# Patient Record
(demographics unavailable — no encounter records)

---

## 2024-11-19 NOTE — HISTORY OF PRESENT ILLNESS
[de-identified] : 29 yrs old female with PMHx of Anx/ Bipolar disease, ADHD, Tourette's Syndrome, presents for a full annual examination. Patient follows up with Psychiatrist Dr Martin Bauer.  Patient states that her anxiety and bipolar disease are very well-controlled, she is not sure about her Tourette syndrome but says that her psychiatrist diagnosed her with that. She tends to nervously clear her throat, or close her eyes and open them frequently?... Patient is concerned about a feeling of crawling in her skin she tends to itch in different parts of her body and this has been going on for years; she specifically says that even before being on all these medications even before the age of 17 she used to have these crawling sensation in her skin.  She would like to see a neurologist. Patient also sprained her right ankle 1 months ago, went to urgent care had an x-ray which was negative was given a wrap and then an ankle sleeve.  She did improve however has still pain and walks with a little bit of a limp she would like to be assessed or referred to an orthopedist.  Vaccines: Covid-2 initials, 1 booster flu shot- wants it Tdap- ?

## 2024-11-19 NOTE — REVIEW OF SYSTEMS
[Itching] : Itching [Negative] : Heme/Lymph [FreeTextEntry4] : phlegm in throat- crackling sound in right ear and occ high pitched sound [FreeTextEntry9] : right ankle [de-identified] : feeling of skin crawling for many years [de-identified] : controlled anxiety and bipolar

## 2024-11-19 NOTE — HISTORY OF PRESENT ILLNESS
[de-identified] : 29 yrs old female with PMHx of Anx/ Bipolar disease, ADHD, Tourette's Syndrome, presents for a full annual examination. Patient follows up with Psychiatrist Dr Martin Bauer.  Patient states that her anxiety and bipolar disease are very well-controlled, she is not sure about her Tourette syndrome but says that her psychiatrist diagnosed her with that. She tends to nervously clear her throat, or close her eyes and open them frequently?... Patient is concerned about a feeling of crawling in her skin she tends to itch in different parts of her body and this has been going on for years; she specifically says that even before being on all these medications even before the age of 17 she used to have these crawling sensation in her skin.  She would like to see a neurologist. Patient also sprained her right ankle 1 months ago, went to urgent care had an x-ray which was negative was given a wrap and then an ankle sleeve.  She did improve however has still pain and walks with a little bit of a limp she would like to be assessed or referred to an orthopedist.  Vaccines: Covid-2 initials, 1 booster flu shot- wants it Tdap- ?

## 2024-11-19 NOTE — HEALTH RISK ASSESSMENT
[Yes] : Yes [Monthly or less (1 pt)] : Monthly or less (1 point) [1 or 2 (0 pts)] : 1 or 2 (0 points) [Never (0 pts)] : Never (0 points) [No] : In the past 12 months have you used drugs other than those required for medical reasons? No [One fall no injury in past year] : Patient reported one fall in the past year without injury [0] : 2) Feeling down, depressed, or hopeless: Not at all (0) [PHQ-2 Negative - No further assessment needed] : PHQ-2 Negative - No further assessment needed [Current] : Current [NO] : No [Patient reported PAP Smear was normal] : Patient reported PAP Smear was normal [HIV test declined] : HIV test declined [None] : None [Alone] : lives alone [Employed] : employed [College] : College [Single] : single [Sexually Active] : sexually active [High Risk Behavior] : high risk behavior [Feels Safe at Home] : Feels safe at home [Reports changes in dental health] : Reports changes in dental health [Smoke Detector] : smoke detector [Carbon Monoxide Detector] : carbon monoxide detector [Safety elements used in home] : safety elements used in home [Seat Belt] :  uses seat belt [Sunscreen] : uses sunscreen [Travel to Developing Areas] : travel to developing areas [de-identified] : marijuana sometimes  [[id number]] : 0 [de-identified] : Jere  [EyeExamDate] : 01/23 [TB Exposure] : is not being exposed to tuberculosis [Caregiver Concerns] : does not have caregiver concerns [PapSmearDate] : 01/24

## 2024-11-19 NOTE — REVIEW OF SYSTEMS
[Itching] : Itching [Negative] : Heme/Lymph [FreeTextEntry4] : phlegm in throat- crackling sound in right ear and occ high pitched sound [FreeTextEntry9] : right ankle [de-identified] : feeling of skin crawling for many years [de-identified] : controlled anxiety and bipolar

## 2024-11-19 NOTE — PHYSICAL EXAM
[Normal Sclera/Conjunctiva] : normal sclera/conjunctiva [PERRL] : pupils equal round and reactive to light [EOMI] : extraocular movements intact [Declined Breast Exam] : declined breast exam  [Normal Supraclavicular Nodes] : no supraclavicular lymphadenopathy [Normal Posterior Cervical Nodes] : no posterior cervical lymphadenopathy [Normal Anterior Cervical Nodes] : no anterior cervical lymphadenopathy [Normal] : affect was normal and insight and judgment were intact [de-identified] : dull TM's

## 2024-11-19 NOTE — HEALTH RISK ASSESSMENT
[Yes] : Yes [Monthly or less (1 pt)] : Monthly or less (1 point) [1 or 2 (0 pts)] : 1 or 2 (0 points) [Never (0 pts)] : Never (0 points) [No] : In the past 12 months have you used drugs other than those required for medical reasons? No [One fall no injury in past year] : Patient reported one fall in the past year without injury [0] : 2) Feeling down, depressed, or hopeless: Not at all (0) [PHQ-2 Negative - No further assessment needed] : PHQ-2 Negative - No further assessment needed [Current] : Current [NO] : No [Patient reported PAP Smear was normal] : Patient reported PAP Smear was normal [HIV test declined] : HIV test declined [None] : None [Alone] : lives alone [Employed] : employed [College] : College [Single] : single [Sexually Active] : sexually active [High Risk Behavior] : high risk behavior [Feels Safe at Home] : Feels safe at home [Reports changes in dental health] : Reports changes in dental health [Smoke Detector] : smoke detector [Carbon Monoxide Detector] : carbon monoxide detector [Safety elements used in home] : safety elements used in home [Seat Belt] :  uses seat belt [Sunscreen] : uses sunscreen [Travel to Developing Areas] : travel to developing areas [de-identified] : marijuana sometimes  [[id number]] : 0 [de-identified] : Jere  [EyeExamDate] : 01/23 [TB Exposure] : is not being exposed to tuberculosis [Caregiver Concerns] : does not have caregiver concerns [PapSmearDate] : 01/24

## 2024-11-19 NOTE — PHYSICAL EXAM
[Normal Sclera/Conjunctiva] : normal sclera/conjunctiva [PERRL] : pupils equal round and reactive to light [EOMI] : extraocular movements intact [Declined Breast Exam] : declined breast exam  [Normal Supraclavicular Nodes] : no supraclavicular lymphadenopathy [Normal Posterior Cervical Nodes] : no posterior cervical lymphadenopathy [Normal Anterior Cervical Nodes] : no anterior cervical lymphadenopathy [Normal] : affect was normal and insight and judgment were intact [de-identified] : dull TM's

## 2024-11-19 NOTE — ASSESSMENT
[FreeTextEntry1] : Patient is up-to-date with all her screenings.  She will be receiving the flu shot today.  She will look into her prior PCP for her Tdap.  1-anxiety/Bipolar disorder/ADHD-patient follows diligently with her psychiatrist Dr. Martin Bauer, she states that she is well-controlled on her medications-she takes Vyvanse in the morning and later in the afternoon takes 1 or 2 Adderall depending on the day. She is also on Lamictal and Klonopin  2-sensation of skin crawling/itching-could be part of her anxiety or possibly medication related?  However patient has had dose feelings even before taking all these medication for many years.  She is insisting on seeing a neurologist, as she also has Tourette's syndrome.  3-right ankle sprain-patient is  over the lateral malleolus and over the lateral aspect of the foot, minimal if any swelling observed.  Patient range of motion at the ankle is a bit limited, especially inversion. Will send patient to be seen by Ortho she may need an MRI.  4-right ear tinnitus-ear exam does not reveal much except for slightly dull tympanic membranes.  Will refer to ENT.

## 2024-11-26 NOTE — ASSESSMENT
[FreeTextEntry1] : 1- HLD- , HDL 67- Ptn is vegetarian, but admits at eating cheese, butter and sweets. will have to avoid these as much as possible and increase exercise.  2- VitD def- mild def, will give 2000IU D3 daily and recheck early spring.  3-anxiety/Bipolar disorder/ADHD-patient follows diligently with her psychiatrist Dr. Martin Bauer, she states that she is well-controlled on her medications-she takes Vyvanse in the morning and later in the afternoon takes 1 or 2 Adderall depending on the day. She is also on Lamictal and Klonopin  4-sensation of skin crawling/itching-could be part of her anxiety or possibly medication related? However patient has had dose feelings even before taking all these medication for many years. She is insisting on seeing a neurologist, as she also has Tourette's syndrome. ptn did not yet make an apt with neurol.  5-right ankle sprain-patient is  over the lateral malleolus and over the lateral aspect of the foot, minimal if any swelling observed. Patient range of motion at the ankle is a bit limited, especially inversion. Ptn has an apt with ortho in few days.  6-right ear tinnitus-ear exam does not reveal much except for slightly dull tympanic membranes. ptn referred to ENT.  7-Thrombocytosis?- ptn will look for old labs- may need to repeat to confirm.

## 2024-11-26 NOTE — HISTORY OF PRESENT ILLNESS
[de-identified] : 29 yrs old female with PMHx of Anx/ Bipolar disease, ADHD, Tourette's Syndrome, presents for FU and lab discussion. Ptn has had a sense of skin crawling/itching for a while and insists on seeing a neurologist, a referral was given last visit. Ptn feels well today, has made an apt with ortho for her sprained ankle , not yet with neurologist.

## 2024-11-26 NOTE — REVIEW OF SYSTEMS
[Itching] : Itching [Negative] : Heme/Lymph [FreeTextEntry4] : tinnitus [FreeTextEntry9] : right ankle [de-identified] : feeling of skin crawling for many years [de-identified] : controlled anxiety and bipolar

## 2024-11-26 NOTE — HEALTH RISK ASSESSMENT
[Yes] : Yes [Monthly or less (1 pt)] : Monthly or less (1 point) [1 or 2 (0 pts)] : 1 or 2 (0 points) [Never (0 pts)] : Never (0 points) [No] : In the past 12 months have you used drugs other than those required for medical reasons? No [One fall no injury in past year] : Patient reported one fall in the past year without injury [0] : 2) Feeling down, depressed, or hopeless: Not at all (0) [PHQ-2 Negative - No further assessment needed] : PHQ-2 Negative - No further assessment needed [Current] : Current [NO] : No [Patient reported PAP Smear was normal] : Patient reported PAP Smear was normal [HIV test declined] : HIV test declined [None] : None [Alone] : lives alone [Employed] : employed [College] : College [Single] : single [Sexually Active] : sexually active [High Risk Behavior] : high risk behavior [Feels Safe at Home] : Feels safe at home [Reports changes in dental health] : Reports changes in dental health [Smoke Detector] : smoke detector [Carbon Monoxide Detector] : carbon monoxide detector [Safety elements used in home] : safety elements used in home [Seat Belt] :  uses seat belt [Sunscreen] : uses sunscreen [Travel to Developing Areas] : travel to developing areas [de-identified] : marijuana sometimes  [FNH5Tblxs] : 0 [de-identified] : Jere  [EyeExamDate] : 01/23 [TB Exposure] : is not being exposed to tuberculosis [Caregiver Concerns] : does not have caregiver concerns [PapSmearDate] : 01/24

## 2024-11-26 NOTE — PHYSICAL EXAM
[Normal Sclera/Conjunctiva] : normal sclera/conjunctiva [PERRL] : pupils equal round and reactive to light [EOMI] : extraocular movements intact [Declined Breast Exam] : declined breast exam  [Normal Supraclavicular Nodes] : no supraclavicular lymphadenopathy [Normal Posterior Cervical Nodes] : no posterior cervical lymphadenopathy [Normal Anterior Cervical Nodes] : no anterior cervical lymphadenopathy [Normal] : affect was normal and insight and judgment were intact [de-identified] : dull TM's [No Edema] : there was no peripheral edema

## 2024-11-26 NOTE — PHYSICAL EXAM
[Normal Sclera/Conjunctiva] : normal sclera/conjunctiva [PERRL] : pupils equal round and reactive to light [EOMI] : extraocular movements intact [Declined Breast Exam] : declined breast exam  [Normal Supraclavicular Nodes] : no supraclavicular lymphadenopathy [Normal Posterior Cervical Nodes] : no posterior cervical lymphadenopathy [Normal Anterior Cervical Nodes] : no anterior cervical lymphadenopathy [Normal] : affect was normal and insight and judgment were intact [de-identified] : dull TM's [No Edema] : there was no peripheral edema

## 2024-11-26 NOTE — HEALTH RISK ASSESSMENT
[Yes] : Yes [Monthly or less (1 pt)] : Monthly or less (1 point) [1 or 2 (0 pts)] : 1 or 2 (0 points) [Never (0 pts)] : Never (0 points) [No] : In the past 12 months have you used drugs other than those required for medical reasons? No [One fall no injury in past year] : Patient reported one fall in the past year without injury [0] : 2) Feeling down, depressed, or hopeless: Not at all (0) [PHQ-2 Negative - No further assessment needed] : PHQ-2 Negative - No further assessment needed [Current] : Current [NO] : No [Patient reported PAP Smear was normal] : Patient reported PAP Smear was normal [HIV test declined] : HIV test declined [None] : None [Alone] : lives alone [Employed] : employed [College] : College [Single] : single [Sexually Active] : sexually active [High Risk Behavior] : high risk behavior [Feels Safe at Home] : Feels safe at home [Reports changes in dental health] : Reports changes in dental health [Smoke Detector] : smoke detector [Carbon Monoxide Detector] : carbon monoxide detector [Safety elements used in home] : safety elements used in home [Seat Belt] :  uses seat belt [Sunscreen] : uses sunscreen [Travel to Developing Areas] : travel to developing areas [de-identified] : marijuana sometimes  [[id number]] : 0 [de-identified] : Jere  [EyeExamDate] : 01/23 [TB Exposure] : is not being exposed to tuberculosis [Caregiver Concerns] : does not have caregiver concerns [PapSmearDate] : 01/24

## 2024-11-26 NOTE — HISTORY OF PRESENT ILLNESS
[de-identified] : 29 yrs old female with PMHx of Anx/ Bipolar disease, ADHD, Tourette's Syndrome, presents for FU and lab discussion. Ptn has had a sense of skin crawling/itching for a while and insists on seeing a neurologist, a referral was given last visit. Ptn feels well today, has made an apt with ortho for her sprained ankle , not yet with neurologist.

## 2024-11-26 NOTE — REVIEW OF SYSTEMS
[Itching] : Itching [Negative] : Heme/Lymph [FreeTextEntry9] : right ankle [FreeTextEntry4] : tinnitus [de-identified] : feeling of skin crawling for many years [de-identified] : controlled anxiety and bipolar

## 2024-11-29 NOTE — HISTORY OF PRESENT ILLNESS
[FreeTextEntry1] : The patient is a 29-year-old female who presents with right ankle pain.  The right ankle pain began one month ago after she rolled her right ankle.  She is referred here by Dr. Rader.  X-rays where taken, no fractures.  She presents walking with a slight limp.  Patient has lost trust in her ankle since the fall.  No other complaints.  Pain scale at worst 5 out of 10.

## 2024-11-29 NOTE — PHYSICAL EXAM
[de-identified] : Right ankle Physical Examination:  General: Alert and oriented x3.  In no acute distress.  Pleasant in nature with a normal affect.  No apparent respiratory distress.  Erythema, Warmth, Rubor: Negative Swelling: Negative  ROM: 1. Dorsiflexion: 10 degrees 2. Plantarflexion: 40 degrees 3. Inversion: 30 degrees 4. Eversion: 20 degrees 5. Subtalar: 10 degrees  Tenderness to Palpation:  1. Lateral Malleolus: Negative 2. Medial Malleolus: Negative 3. Proximal Fibular Pain: Negative 4. Heel Pain: Negative 5. Cuboid: Negative 6. Navicular: Negative 7. Tibiotalar Joint: + central ankle tibiotalar joint to palpation. 8. Subtalar Joint: Negative 9. Posterior Recess: Negative  Tendon Pain: 1. Achilles: Negative 2. Peroneals: Negative 3. Posterior Tibialis: Negative 4. Tibialis Anterior: Negative  Ligament Pain: 1. ATFL: + 2. CFL: Negative  3. PTFL: Negative 4. Deltoid Ligaments: Negative 5. Lis Franc Ligament: Negative  Stability:  1. Anterior Drawer: Negative 2. Posterior Drawer: Negative  Strength: 5/5 TA/GS/EHL  Pulses: 2+ DP/PT Pulses  Neuro: Intact motor and sensory  Additional Test: 1. Calcaneal Squeeze Test: Negative 2. Syndesmosis Squeeze Test: Negative [de-identified] : 6 views x-rays right ankle/right foot reviewed, 11/29/2024: No fractures or abnormalities seen.

## 2024-11-29 NOTE — DISCUSSION/SUMMARY
[de-identified] : Because of the patient's continued pain in the central ankle tibiotalar joint and lateral ligaments, I do want to proceed with an MRI of the right ankle without contrast to evaluate the cartilage in the tibiotalar joint for a possible injury from the trauma/lateral ligaments for tearing.  ASO brace given for ankle support and stability, use as directed/as instructed, as needed.  Physical therapy prescription given for right ankle strength and conditioning program as tolerated.  Over-the-counter NSAIDs and Tylenol as needed for pain.  Activity modifications.  Once the MRI is completed, the patient return to office versus TTM to review the MRI findings.  All of her questions were answered.  She understood and agreed to the treatment course.  The patient is aware that the MRI needs authorization by the insurance company.  The patient is also aware that there are no guarantees that the insurance company will authorize the MRI.

## 2025-01-27 NOTE — CONSULT LETTER
[FreeTextEntry1] : Dear Dr. FELICIA PHAN    I had the pleasure of evaluating your patient EDNA BARDALES, thank you for allowing us to participate in their care. please see full note detailing our visit below.  If you have any questions, please do not hesitate to call me and I would be happy to discuss further.       Anthony Howell M.D.   Attending Physician,     Department of Otolaryngology - Head and Neck Surgery   [facility]    Office: [phone]   Fax: [phone]

## 2025-01-27 NOTE — ASSESSMENT
[FreeTextEntry1] : 29 year old female presents with right ear HL and occasional ringing. No fluid seen.  - audiogram performed and reviewed today 01/27/25 - wnl FB removed - felt improvement   Also with globus sensation and occasional post nasal drip. On exam, erythema and edema consistent with acid reflux. - Will proceed to start lifestyle regiment to reduce overproduction of acid and reduce laryngeal reflux including avoiding caffiene, alcohol, eating before bed, peppermint, spicy and fatty foods, and head elevation at night etc. Handout detailing regiment also given. - start PPI BID

## 2025-01-27 NOTE — REASON FOR VISIT
[Initial Evaluation] : an initial evaluation for [Ear Drainage] : ear drainage [FreeTextEntry2] : right ear

## 2025-01-27 NOTE — PROCEDURE
[FreeTextEntry3] : Procedure- Removal of right ear FB  Diagnosis - right ear FB  Right ear found to have left ear FB, under microscopy removed under direct vision with alligator, canal appeared normal. [de-identified] : Anthony Howell M.D. [de-identified] : Procedure performed: laryngeal Endoscopy- Diagnostic Pre-op/post op indication: dysphonia Verbal and/or written consent obtained from patient, Patient was unable to cooperate with mirror Scope #: 3, flexible fiber optic telescope used  Scope was introduced through the nose passed on the floor of the nose to the nasopharynx and then followed down the soft palate to the lower pharynx. The tongue Base, Larynx, Hypopharynx were examined. Base of tongue was symmetric, vallecular was clear, epiglottis was not deformed, subglottis/ pyriform and posterior pharyngeal walls were clear. No erythema, edema, pooling of secretions, masses or lesions. Airway patent, no foreign body visualized. Postcricoid area with moderate erythema and mild edema. + intra-artenoid bar. No pooling of secretions. True vocal cords, vestibular folds, ventricles, pyriform sinuses, and aryepiglottic folds appear normal bilaterally. Vocal cords mobile with good contact b/l.

## 2025-01-27 NOTE — PHYSICAL EXAM
[Nasal Endoscopy Performed] : nasal endoscopy was performed, see procedure section for findings [Normal] : no abnormal secretions [Laryngoscopy Performed] : laryngoscopy was performed, see procedure section for findings [de-identified] : FB - pet air on TM  [de-identified] : reduced

## 2025-01-27 NOTE — HISTORY OF PRESENT ILLNESS
[de-identified] : 29 year old female presents with right ear thundering sound and hearing loss worse on right. States has to have others repeat themselves. States PCP saw fluid in right ear a few months ago. Occasional ringing or beeping in ears once a month. no Vertigo, pain, drainage or facial weakness.  Also with sensation of phlegm in throat for many months. States has to clear throat constantly. Occasional post nasal drip. States she presses area on throat to try to clear it. Does vape.  Had tonsils removed as child.   Stuffy on right side - waxes and wanes. septoplasty and turbinate reduction [facility]

## 2025-05-22 NOTE — PHYSICAL EXAM
[General Appearance - Alert] : alert [General Appearance - In No Acute Distress] : in no acute distress [General Appearance - Well Nourished] : well nourished [General Appearance - Well-Appearing] : healthy appearing [FreeTextEntry1] : Overweight [Oriented To Time, Place, And Person] : oriented to person, place, and time [Impaired Insight] : insight and judgment were intact [Affect] : the affect was normal [Mood] : the mood was normal [Memory Recent] : recent memory was not impaired [Memory Remote] : remote memory was not impaired [Person] : oriented to person [Place] : oriented to place [Time] : oriented to time [Concentration Intact] : normal concentrating ability [Visual Intact] : visual attention was ~T not ~L decreased [Writing A Sentence] : no difficulty writing a sentence [Fluency] : fluency intact [Comprehension] : comprehension intact [Reading] : reading intact [Past History] : adequate knowledge of personal past history [Cranial Nerves Optic (II)] : visual acuity intact bilaterally,  visual fields full to confrontation, pupils equal round and reactive to light [Cranial Nerves Oculomotor (III)] : extraocular motion intact [Cranial Nerves Trigeminal (V)] : facial sensation intact symmetrically [Cranial Nerves Facial (VII)] : face symmetrical [Cranial Nerves Vestibulocochlear (VIII)] : hearing was intact bilaterally [Cranial Nerves Glossopharyngeal (IX)] : tongue and palate midline [Cranial Nerves Accessory (XI - Cranial And Spinal)] : head turning and shoulder shrug symmetric [Cranial Nerves Hypoglossal (XII)] : there was no tongue deviation with protrusion [Motor Tone] : muscle tone was normal in all four extremities [Motor Strength] : muscle strength was normal in all four extremities [No Muscle Atrophy] : normal bulk in all four extremities [Paresis Pronator Drift Right-Sided] : no pronator drift on the right [Paresis Pronator Drift Left-Sided] : no pronator drift on the left [Sensation Tactile Decrease] : light touch was intact [Sensation Pain / Temperature Decrease] : pain and temperature was intact [Sensation Vibration Decrease] : vibration was intact [Proprioception] : proprioception was intact [Romberg's Sign] : Romberg's sign was negtive [Abnormal Walk] : normal gait [Balance] : balance was intact [Past-pointing] : there was no past-pointing [Tremor] : no tremor present [Dysdiadochokinesia Bilaterally] : not present [Coordination - Dysmetria Impaired Finger-to-Nose Bilateral] : not present [Coordination - Dysmetria Impaired Heel-to-Shin Bilateral] : not present [2+] : Patella left 2+ [1+] : Ankle jerk left 1+ [Plantar Reflex Right Only] : normal on the right [Plantar Reflex Left Only] : normal on the left [___] : absent on the right [___] : absent on the left [Full Pulse] : the pedal pulses are present [Edema] : there was no peripheral edema [Skin Color & Pigmentation] : normal skin color and pigmentation [Skin Turgor] : normal skin turgor [] : no rash

## 2025-05-22 NOTE — DISCUSSION/SUMMARY
[FreeTextEntry1] : 29-year-old woman complaining of uncomfortable recurrent sensations in the body, usually only occurs at rest or when she is not moving for long, activities movement seem to be the best solution.  Neurological examination is nonfocal. Possibly associated with restless leg syndrome, will schedule a nerve conduction electromyography rule out underlying neuropathy. Could benefit from skin biopsy to rule out small fiber neuropathy. Will do a trial with gabapentin 100 mg, 1 twice a day. Return after the above.

## 2025-05-22 NOTE — DATA REVIEWED
[de-identified] :   	Test  	Result  	Flag	Reference	Goal  	Sodium	140 mmol/L	 	135-145	  	Potassium	4.4 mmol/L	 	3.5-5.3	  	Chloride	102 mmol/L	 		  	CO2	23 mmol/L	 	22-31	  	Anion Gap, Serum	15 mmol/L	 	5-17	 	Glucose	101 mg/dL	H	70-99	  	BUN	11 mg/dL	 	7-23	  	Creatinine	0.71 mg/dL	 	0.50-1.30	  	Total Protein	7.3 g/dL	 	6.0-8.3	  	Albumin	4.6 g/dL	 	3.3-5.0	  	Calcium, Serum	10.3 mg/dL	 	8.4-10.5	  	Total Bilirubin	0.2 mg/dL	 	0.2-1.2	  	AST (SGOT)	12 U/L	 	10-40	  	ALT(SGPT)	13 U/L	 	10-45	  	ALK PHOS	85 U/L	 		  	eGFR	118 mL/min/1.73m2	 	>=60	  	 The estimated glomerular filtration rate (eGFR) calculation is based on the 2021 CKD-EPI creatinine equation, which is validated in male and female population 18 years of age and older (N Engl J Med 2021; 385:1455-4585).   	Test  	Result  	Flag	Reference	Goal  	WBC	7.37 K/uL	 	3.80-10.50	  	RBC Count	4.86 M/uL	 	3.80-5.20	  	HGB	13.9 g/dL	 	11.5-15.5	  	HCT	44.7 %	 	34.5-45.0	  	Mean Cell Volume	92.0 fl	 	80.0-100.0	  	Mean Cell Hemoglobin	28.6 pg	 	27.0-34.0	 	Mean Cell Hemoglobin Conc	31.1 g/dL	L	32.0-36.0	  	Red Cell Distrib Width	12.7 %	 	10.3-14.5	 	PLT	497 K/uL	H	150-400	  	MAN DIFF?	N/A	 	No	  	 Resulted by Discern  	Neutrophil %	56.1 %	 	43.0-77.0	  	 Differential percentages must be correlated with absolute numbers for clinical significance.  	Lymphocyte %	29.9 %	 	13.0-44.0	  	Monocyte %	9.2 %	 	2.0-14.0	  	Eosinophil %	3.8 %	 	0.0-6.0	  	Basophil %	0.7 %	 	0.0-2.0	  	Auto Immature Granulocyte %	0.3 %	 	0.0-0.9	  	 (Includes meta, myelo and promyelocytes). Mild elevations in immature granulocytes may b   	Test  	Result  	Flag	Reference	Goal  	B12	796 pg/mL	 	232-1245	   	Test  	Result  	Flag	Reference	Goal  	TSHX	3.26 uIU/mL	 	0.27-4.20

## 2025-05-22 NOTE — HISTORY OF PRESENT ILLNESS
[FreeTextEntry1] : 29-year-old woman with a history of bipolar, attention deficit disorder, Tourette's has been complaining ever since she can remember of a sensation of itching almost crawling which traveled throughout the body, not specific to her location, her foot, her shoulder could be her head, her face could be the leg, if she had Norit it becomes almost distressing enough that she has to do something just to move just to scratch it.  Symptoms are better when she is moving when she is active.  They are worse when she is at rest or when she is sitting for period of time. There is no discoloration of the skin, no rashes, no association with menstrual cycle, humidity, time of the year.  Emotional state.  What she ate or drank. Does not seem to be related to her medications either. She has been evaluated by dermatologist, primary care blood work, everything negative.

## 2025-07-23 NOTE — HISTORY OF PRESENT ILLNESS
[FreeTextEntry1] : 30-year-old woman right-handed here for follow-up visit, last time seen in this office May 2025, patient complaining of uncomfortable feelings of itching, not quite numbness seems to be throughout, can be on the right can be on the left, with the arm, and the leg.  Unrelated to position or activity.  No muscle cramps or weakness, denies any tingling numbness.  No neck or back pain rating to the extremities.  No change in bowel bladder habits.

## 2025-07-23 NOTE — DISCUSSION/SUMMARY
[FreeTextEntry1] : 30-year-old woman complaining of paresthesias, itching, nonfocal exam, no electrodiagnostic evidence of underlying neuropathy or neuropathic condition.  Patient seen earlier recommended gabapentin which does not seem to help.  So we will discontinue.  Advise follow-up with dermatology, allergy immunology. Return as needed.

## 2025-07-23 NOTE — PROCEDURE
[FreeTextEntry1] : Electromyography and nerve conduction study of the right upper and right lower extremity is normal no underlying evidence of a peripheral neuropathy at this time.

## 2025-07-23 NOTE — PHYSICAL EXAM
[General Appearance - Alert] : alert [General Appearance - In No Acute Distress] : in no acute distress [General Appearance - Well Nourished] : well nourished [General Appearance - Well-Appearing] : healthy appearing [] : normal voice and communication [Person] : oriented to person [Place] : oriented to place [Time] : oriented to time [Cranial Nerves Optic (II)] : visual acuity intact bilaterally,  visual fields full to confrontation, pupils equal round and reactive to light [Cranial Nerves Oculomotor (III)] : extraocular motion intact [Cranial Nerves Trigeminal (V)] : facial sensation intact symmetrically [Cranial Nerves Facial (VII)] : face symmetrical [Cranial Nerves Vestibulocochlear (VIII)] : hearing was intact bilaterally [Cranial Nerves Glossopharyngeal (IX)] : tongue and palate midline [Cranial Nerves Hypoglossal (XII)] : there was no tongue deviation with protrusion [Cranial Nerves Accessory (XI - Cranial And Spinal)] : head turning and shoulder shrug symmetric [Motor Tone] : muscle tone was normal in all four extremities [Motor Strength] : muscle strength was normal in all four extremities [No Muscle Atrophy] : normal bulk in all four extremities [Sensation Tactile Decrease] : light touch was intact [Abnormal Walk] : normal gait [2+] : Patella left 2+ [1+] : Ankle jerk left 1+ [FreeTextEntry1] : Overweight [Paresis Pronator Drift Right-Sided] : no pronator drift on the right [Paresis Pronator Drift Left-Sided] : no pronator drift on the left [Past-pointing] : there was no past-pointing [Tremor] : no tremor present [Dysdiadochokinesia Bilaterally] : not present [Coordination - Dysmetria Impaired Finger-to-Nose Bilateral] : not present [Coordination - Dysmetria Impaired Heel-to-Shin Bilateral] : not present [Plantar Reflex Right Only] : normal on the right [Plantar Reflex Left Only] : normal on the left [___] : absent on the right [___] : absent on the left